# Patient Record
Sex: MALE | Race: BLACK OR AFRICAN AMERICAN | NOT HISPANIC OR LATINO | URBAN - METROPOLITAN AREA
[De-identification: names, ages, dates, MRNs, and addresses within clinical notes are randomized per-mention and may not be internally consistent; named-entity substitution may affect disease eponyms.]

---

## 2017-11-04 ENCOUNTER — EMERGENCY (EMERGENCY)
Facility: HOSPITAL | Age: 27
LOS: 0 days | Discharge: HOME | End: 2017-11-04
Attending: EMERGENCY MEDICINE
Payer: COMMERCIAL

## 2017-11-04 VITALS
DIASTOLIC BLOOD PRESSURE: 67 MMHG | SYSTOLIC BLOOD PRESSURE: 118 MMHG | OXYGEN SATURATION: 100 % | RESPIRATION RATE: 17 BRPM | WEIGHT: 192.02 LBS | HEART RATE: 82 BPM | TEMPERATURE: 98 F | HEIGHT: 69 IN

## 2017-11-04 PROCEDURE — 99283 EMERGENCY DEPT VISIT LOW MDM: CPT

## 2017-11-04 PROCEDURE — 73610 X-RAY EXAM OF ANKLE: CPT | Mod: 26,LT

## 2017-11-04 NOTE — ED ADULT TRIAGE NOTE - CHIEF COMPLAINT QUOTE
" My ankle, I hurt a couple days ago and I woke up this morning, I have sharp pain when I put pressure on it" left ankle

## 2017-11-04 NOTE — ED ADULT NURSE NOTE - CARDIO ASSESSMENT
Date: 10/18/2021    Patient Name: Talita Carter          To Whom it may concern: This letter has been written at the patient's request. The above patient was seen at the [facility] for treatment of a medical condition.     Lisa Collazo may retu WDL

## 2017-11-04 NOTE — ED ADULT NURSE NOTE - OBJECTIVE STATEMENT
pt states he hurt his foot at work a few days ago.  pt stated the door hit the top of his foot and shin area.  no swelling noted at this time.
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
## 2017-11-06 DIAGNOSIS — S93.402A SPRAIN OF UNSPECIFIED LIGAMENT OF LEFT ANKLE, INITIAL ENCOUNTER: ICD-10-CM
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
## 2017-11-06 DIAGNOSIS — M25.572 PAIN IN LEFT ANKLE AND JOINTS OF LEFT FOOT: ICD-10-CM

## 2019-10-25 ENCOUNTER — EMERGENCY (EMERGENCY)
Facility: HOSPITAL | Age: 29
LOS: 1 days | Discharge: HOME | End: 2019-10-25
Attending: EMERGENCY MEDICINE
Payer: COMMERCIAL

## 2019-10-25 VITALS
OXYGEN SATURATION: 95 % | HEIGHT: 67 IN | WEIGHT: 195.11 LBS | SYSTOLIC BLOOD PRESSURE: 124 MMHG | RESPIRATION RATE: 18 BRPM | HEART RATE: 93 BPM | DIASTOLIC BLOOD PRESSURE: 78 MMHG | TEMPERATURE: 98 F

## 2019-10-25 PROCEDURE — 99284 EMERGENCY DEPT VISIT MOD MDM: CPT | Mod: 25

## 2019-10-25 PROCEDURE — 29515 APPLICATION SHORT LEG SPLINT: CPT

## 2019-10-25 PROCEDURE — 73630 X-RAY EXAM OF FOOT: CPT

## 2019-10-25 PROCEDURE — 73610 X-RAY EXAM OF ANKLE: CPT | Mod: 26,RT

## 2019-10-25 PROCEDURE — 29515 APPLICATION SHORT LEG SPLINT: CPT | Mod: RT

## 2019-10-25 PROCEDURE — 73630 X-RAY EXAM OF FOOT: CPT | Mod: 26,RT

## 2019-10-25 PROCEDURE — 73610 X-RAY EXAM OF ANKLE: CPT

## 2019-10-25 RX ORDER — IBUPROFEN 200 MG
600 TABLET ORAL ONCE
Refills: 0 | Status: COMPLETED | OUTPATIENT
Start: 2019-10-25 | End: 2019-10-25

## 2019-10-25 RX ORDER — ACETAMINOPHEN 500 MG
975 TABLET ORAL ONCE
Refills: 0 | Status: COMPLETED | OUTPATIENT
Start: 2019-10-25 | End: 2019-10-25

## 2019-10-25 RX ADMIN — Medication 600 MILLIGRAM(S): at 10:32

## 2019-10-25 RX ADMIN — Medication 975 MILLIGRAM(S): at 10:31

## 2019-10-25 NOTE — ED ADULT NURSE REASSESSMENT NOTE - NS ED NURSE REASSESS COMMENT FT1
pt with ankle splinted pt demonstrated good crutch walking tecnique in er states he has used crutches before pt for discharge with ortho follow up as instructed by md pt discharged from ft by ER resident with no further nursing intervention

## 2019-10-25 NOTE — ED PROVIDER NOTE - NS ED ROS FT
GENERAL: No fever or chills  EYES: no change in vision  HEENT: no trouble swallowing or speaking  CARDIAC: no chest pain or palpiations   PULMONARY: no cough or SOB  GI: no abdominal pain, nausea, vomiting, diarrhea, or constipation   : No changes in urination  SKIN: no rashes  NEURO: no headache, numbness, or weakness.  MSK: right ankle/ foot pain see hpi

## 2019-10-25 NOTE — ED PROVIDER NOTE - PATIENT PORTAL LINK FT
You can access the FollowMyHealth Patient Portal offered by [facility] by registering at the following website: [email or web address]. By joining [employer]’s FollowMyHealth portal, you will also be able to view your health information using other applications (apps) compatible with our system.

## 2019-10-25 NOTE — ED PROCEDURE NOTE - CPROC ED POST PROC CARE GUIDE1
f/u with podiatry/Elevate the injured extremity as instructed./Keep the cast/splint/dressing clean and dry./Verbal/written post procedure instructions were given to patient/caregiver./Instructed patient/caregiver to follow-up with primary care physician.

## 2019-10-25 NOTE — ED ADULT NURSE NOTE - OBJECTIVE STATEMENT
pt states coming down stairs at work missed step injury to right ankle area when weight bearing pt no hx no meds ice lore applied on arrival to fast track area pt denies any hx or meds pt alvin kumar in FT area

## 2019-10-25 NOTE — ED PROVIDER NOTE - CLINICAL SUMMARY MEDICAL DECISION MAKING FREE TEXT BOX
30 yo M with no PMH presents with R ankle and foot pain after "twisting" his ankle 3 hours ago as he was descending stairs.  Vitals WNL. TTP on dorsal aspect of R foot at the level of the 2nd to 4th metatarsal and tarso-metatarsal joints with associated swelling. Also 5th metatarsal point TTP on the lateral aspect. Normal eversion and inversion. Decreased dorsiflexion. Neurovascularly intact. Soft compartments. No concern for compartment syndrome or neurovascular compromise. Will provide analgesia, X-rays of ankle and foot and re-evaluate. 28 yo M with no PMH presents with R ankle and foot pain after "twisting" his ankle 3 hours ago as he was descending stairs.  Vitals WNL. TTP on dorsal aspect of R foot at the level of the 2nd to 4th metatarsal and tarso-metatarsal joints with associated swelling. Also 5th metatarsal point TTP on the lateral aspect. Normal eversion and inversion. Decreased dorsiflexion. Skin abrasion on lateral aspect pf R foot with tetanus UTD. Neurovascularly intact. Soft compartments. No concern for compartment syndrome or neurovascular compromise. Will provide analgesia, X-rays of ankle and foot and re-evaluate. 30 yo M with no PMH presents with R ankle and foot pain after "twisting" his ankle 3 hours ago as he was descending stairs.  Vitals WNL. TTP on dorsal aspect of R foot at the level of the 2nd to 4th metatarsal and tarso-metatarsal joints with associated swelling. Also 5th metatarsal point TTP on the lateral aspect. Normal eversion and inversion. Decreased dorsiflexion. Skin abrasion on lateral aspect pf R foot with tetanus UTD. Neurovascularly intact. Soft compartments. No concern for compartment syndrome or neurovascular compromise. Will provide analgesia, X-rays of ankle and foot and re-evaluate.  --BMM

## 2019-10-25 NOTE — ED PROVIDER NOTE - NSFOLLOWUPINSTRUCTIONS_ED_ALL_ED_FT
You were seen in the ED for ankle . foot pain  The following labs/imaging were obtained: see attached  Take Acetaminophen (Tylenol) (no more than 4,000mg in 24 hrs) AND /OR Ibuprofen (Motrin) with meals (no more than 1,200mg in 24 hrs) for pain.  Return to the ED if you develop worsening foot pain, tingling on your foot / toes, abnormal skin color, or any new concerning symptom.   Follow up with podiatry in 1 week.   Discussed with pt results of work up, strict return precautions, and need for follow up.  Pt expressed understanding and agrees with plan. You were seen in the ED for ankle . foot pain  The following labs/imaging were obtained: see attached  Take Acetaminophen (Tylenol) (no more than 4,000mg in 24 hrs) AND /OR Ibuprofen (Motrin) with meals (no more than 1,200mg in 24 hrs) for pain.  Wear crutches as instructed. Keep splint on until seen by podiatry. Remove it and come back to the ED if you develop worsening pain or any of the symptoms mentioned above.   Return to the ED if you develop worsening foot pain, tingling on your foot / toes, abnormal skin color, or any new concerning symptom.   Follow up with podiatry in 1 week.   Discussed with pt results of work up, strict return precautions, and need for follow up.  Pt expressed understanding and agrees with plan.

## 2019-10-25 NOTE — ED PROVIDER NOTE - CARE PLAN
Principal Discharge DX:	Closed nondisplaced fracture of navicular bone of right foot, initial encounter

## 2019-10-25 NOTE — ED PROVIDER NOTE - OBJECTIVE STATEMENT
28 yo M with no PMH presents with R ankle and foot pain after "twisting" his ankle 3 hours ago. Reports he twisted his R ankle as he was walking down the stairs and missed a step. Denies falling or head trauma. His pain was not initially as severe and was able to ambulate with some discomfort bearing weight. He reports a 7/10 constant pain on the dorsal and lateral aspect of his right foot, worse on ambulation. Denies knee pain. Reports lateral foot skin abrasion with minimal bleeding. Not on blood thinners. Denies paresthesias. 28 yo M with no PMH presents with R ankle and foot pain after "twisting" his ankle 3 hours ago. Reports he twisted his R ankle as he was walking down the stairs and missed a step. Denies falling or head trauma. His pain was not initially as severe and was able to ambulate with some discomfort bearing weight. He reports a 7/10 constant pain on the dorsal and lateral aspect of his right foot, worse on ambulation. Denies knee pain. Reports lateral foot skin abrasion with minimal bleeding. Last tetanus vaccine was 1 year ago. Not on blood thinners. Denies paresthesias.

## 2019-10-25 NOTE — ED PROVIDER NOTE - CARE PROVIDER_API CALL
Sathish Colby (DPM)  Foot Surgery; Recon RearfootAnkle Surgery  [address] [place] [address]  Phone: [phone]  Fax: [phone]  Follow Up Time:

## 2019-10-25 NOTE — ED PROVIDER NOTE - PHYSICAL EXAMINATION
Gen: AAOx3, non-toxic  Head: NCAT  HEENT: EOMI, oral mucosa moist, normal conjunctiva  Lung: CTAB, no respiratory distress, no wheezes/rhonchi/rales B/L, speaking in full sentences  CV: RRR, no murmurs, rubs or gallops  Abd: soft, NTND, no guarding, no CVA tenderness  MSK: TTP on dorsal aspect of R foot at the level of the 2nd to 4th metatarsal and tarso-metatarsal joints with associated swelling. Also 5th metatarsal point TTP on the lateral aspect. Normal eversion and inversion. Decreased dorsiflexion. Neurovascularly intact. Soft compartments.   Neuro: No focal sensory or motor deficits, normal CN exam   Skin: Warm, well perfused, no rash  Psych: normal affect. Gen: AAOx3, non-toxic  Head: NCAT  HEENT: EOMI, oral mucosa moist, normal conjunctiva  Lung: CTAB, no respiratory distress, no wheezes/rhonchi/rales B/L, speaking in full sentences  CV: RRR, no murmurs, rubs or gallops  Abd: soft, NTND, no guarding, no CVA tenderness  MSK: TTP on dorsal aspect of R foot at the level of the 2nd to 4th metatarsal and tarso-metatarsal joints with associated swelling. Also 5th metatarsal point TTP on the lateral aspect. Normal eversion and inversion. Decreased dorsiflexion. Neurovascularly intact. Soft compartments. Has skin abrasion on lateral aspect of R foot.   Neuro: No focal sensory or motor deficits, normal CN exam   Skin: Warm, well perfused, no rash  Psych: normal affect.

## 2019-12-13 ENCOUNTER — EMERGENCY (EMERGENCY)
Facility: HOSPITAL | Age: 29
LOS: 1 days | Discharge: HOME | End: 2019-12-13
Attending: EMERGENCY MEDICINE
Payer: SELF-PAY

## 2019-12-13 VITALS
HEIGHT: 68 IN | OXYGEN SATURATION: 100 % | TEMPERATURE: 98 F | RESPIRATION RATE: 16 BRPM | HEART RATE: 67 BPM | DIASTOLIC BLOOD PRESSURE: 89 MMHG | WEIGHT: 190.04 LBS | SYSTOLIC BLOOD PRESSURE: 152 MMHG

## 2019-12-13 LAB
ALBUMIN SERPL ELPH-MCNC: 4.6 G/DL — SIGNIFICANT CHANGE UP (ref 3.3–5.2)
ALP SERPL-CCNC: 64 U/L — SIGNIFICANT CHANGE UP (ref 40–120)
ALT FLD-CCNC: 19 U/L — SIGNIFICANT CHANGE UP
ANION GAP SERPL CALC-SCNC: 15 MMOL/L — SIGNIFICANT CHANGE UP (ref 5–17)
AST SERPL-CCNC: 28 U/L — SIGNIFICANT CHANGE UP
BILIRUB SERPL-MCNC: 0.4 MG/DL — SIGNIFICANT CHANGE UP (ref 0.4–2)
BUN SERPL-MCNC: 14 MG/DL — SIGNIFICANT CHANGE UP (ref 8–20)
CALCIUM SERPL-MCNC: 9.5 MG/DL — SIGNIFICANT CHANGE UP (ref 8.6–10.2)
CHLORIDE SERPL-SCNC: 101 MMOL/L — SIGNIFICANT CHANGE UP (ref 98–107)
CO2 SERPL-SCNC: 24 MMOL/L — SIGNIFICANT CHANGE UP (ref 22–29)
CREAT SERPL-MCNC: 0.98 MG/DL — SIGNIFICANT CHANGE UP (ref 0.5–1.3)
GLUCOSE SERPL-MCNC: 90 MG/DL — SIGNIFICANT CHANGE UP (ref 70–115)
HCT VFR BLD CALC: 39.9 % — SIGNIFICANT CHANGE UP (ref 39–50)
HGB BLD-MCNC: 13.7 G/DL — SIGNIFICANT CHANGE UP (ref 13–17)
MCHC RBC-ENTMCNC: 30.4 PG — SIGNIFICANT CHANGE UP (ref 27–34)
MCHC RBC-ENTMCNC: 34.3 GM/DL — SIGNIFICANT CHANGE UP (ref 32–36)
MCV RBC AUTO: 88.5 FL — SIGNIFICANT CHANGE UP (ref 80–100)
PLATELET # BLD AUTO: 174 K/UL — SIGNIFICANT CHANGE UP (ref 150–400)
POTASSIUM SERPL-MCNC: 4 MMOL/L — SIGNIFICANT CHANGE UP (ref 3.5–5.3)
POTASSIUM SERPL-SCNC: 4 MMOL/L — SIGNIFICANT CHANGE UP (ref 3.5–5.3)
PROT SERPL-MCNC: 7.5 G/DL — SIGNIFICANT CHANGE UP (ref 6.6–8.7)
RBC # BLD: 4.51 M/UL — SIGNIFICANT CHANGE UP (ref 4.2–5.8)
RBC # FLD: 11.9 % — SIGNIFICANT CHANGE UP (ref 10.3–14.5)
SODIUM SERPL-SCNC: 140 MMOL/L — SIGNIFICANT CHANGE UP (ref 135–145)
TROPONIN T SERPL-MCNC: <0.01 NG/ML — SIGNIFICANT CHANGE UP (ref 0–0.06)
WBC # BLD: 8.67 K/UL — SIGNIFICANT CHANGE UP (ref 3.8–10.5)
WBC # FLD AUTO: 8.67 K/UL — SIGNIFICANT CHANGE UP (ref 3.8–10.5)

## 2019-12-13 PROCEDURE — 99284 EMERGENCY DEPT VISIT MOD MDM: CPT

## 2019-12-13 PROCEDURE — 85027 COMPLETE CBC AUTOMATED: CPT

## 2019-12-13 PROCEDURE — 71046 X-RAY EXAM CHEST 2 VIEWS: CPT | Mod: 26

## 2019-12-13 PROCEDURE — 93010 ELECTROCARDIOGRAM REPORT: CPT

## 2019-12-13 PROCEDURE — 80053 COMPREHEN METABOLIC PANEL: CPT

## 2019-12-13 PROCEDURE — 71046 X-RAY EXAM CHEST 2 VIEWS: CPT

## 2019-12-13 PROCEDURE — 84484 ASSAY OF TROPONIN QUANT: CPT

## 2019-12-13 PROCEDURE — 36415 COLL VENOUS BLD VENIPUNCTURE: CPT

## 2019-12-13 PROCEDURE — 93005 ELECTROCARDIOGRAM TRACING: CPT

## 2019-12-13 PROCEDURE — 99284 EMERGENCY DEPT VISIT MOD MDM: CPT | Mod: 25

## 2019-12-13 PROCEDURE — 99053 MED SERV 10PM-8AM 24 HR FAC: CPT

## 2019-12-13 RX ORDER — IBUPROFEN 200 MG
600 TABLET ORAL ONCE
Refills: 0 | Status: COMPLETED | OUTPATIENT
Start: 2019-12-13 | End: 2019-12-13

## 2019-12-13 RX ADMIN — Medication 600 MILLIGRAM(S): at 03:38

## 2019-12-13 NOTE — ED PROVIDER NOTE - NSFOLLOWUPCLINICS_GEN_ALL_ED_FT
[facility]  Cardiology  [address] [place] [address]  Phone: [phone]  Fax:   Follow Up Time:

## 2019-12-13 NOTE — ED PROVIDER NOTE - PATIENT PORTAL LINK FT
You can access the FollowMyHealth Patient Portal offered by [facility] by registering at the following website: [email or web address]. By joining [employer]’s FollowMyHealth portal, you will also be able to view your health information using other applications (apps) compatible with our system.

## 2019-12-13 NOTE — ED PROVIDER NOTE - OBJECTIVE STATEMENT
28 yo male no significant past medical hx presenting in [employer] custody with center chest pain worse with movements and breathing. states that pain started after lifting this afternoon and again when arguing with partner prior to being arrested. smoker daily. no cardiac hx no family cardiac issues. denies majuana use or illicit drug use. social drinker. denies recent sick contact cold symptoms fever chills nausea vomiting abdominal pain or sob. no medication taken for symptoms.

## 2019-12-13 NOTE — ED PROVIDER NOTE - CARDIAC, MLM
Normal rate, regular rhythm.  Heart sounds S1, S2.  No murmurs, rubs or gallops. sub sternal chest tenderness with palpation
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
## 2019-12-13 NOTE — ED PROVIDER NOTE - ATTENDING CONTRIBUTION TO CARE
29y old with complaints of chets pain, plan to get ekg, chest xray, risk factors, of cardiac causea, plan to educte patiet,  I personally saw the patient with the PA, and completed the key components of the history and physical exam. I then discussed the management plan with the PA.

## 2020-12-03 ENCOUNTER — EMERGENCY (EMERGENCY)
Facility: HOSPITAL | Age: 30
LOS: 1 days | Discharge: HOME | End: 2020-12-03
Attending: EMERGENCY MEDICINE
Payer: COMMERCIAL

## 2020-12-03 VITALS
SYSTOLIC BLOOD PRESSURE: 121 MMHG | RESPIRATION RATE: 20 BRPM | TEMPERATURE: 99 F | WEIGHT: 201.94 LBS | HEART RATE: 92 BPM | DIASTOLIC BLOOD PRESSURE: 79 MMHG | HEIGHT: 67 IN | OXYGEN SATURATION: 97 %

## 2020-12-03 VITALS
DIASTOLIC BLOOD PRESSURE: 78 MMHG | SYSTOLIC BLOOD PRESSURE: 120 MMHG | TEMPERATURE: 98 F | HEART RATE: 85 BPM | OXYGEN SATURATION: 98 % | RESPIRATION RATE: 20 BRPM

## 2020-12-03 LAB
ALBUMIN SERPL ELPH-MCNC: 4.9 G/DL — SIGNIFICANT CHANGE UP (ref 3.3–5)
ALP SERPL-CCNC: 60 U/L — SIGNIFICANT CHANGE UP (ref 40–120)
ALT FLD-CCNC: 19 U/L — SIGNIFICANT CHANGE UP (ref 10–45)
ANION GAP SERPL CALC-SCNC: 11 MMOL/L — SIGNIFICANT CHANGE UP (ref 5–17)
APPEARANCE UR: CLEAR — SIGNIFICANT CHANGE UP
AST SERPL-CCNC: 23 U/L — SIGNIFICANT CHANGE UP (ref 10–40)
BASOPHILS # BLD AUTO: 0 K/UL — SIGNIFICANT CHANGE UP (ref 0–0.2)
BASOPHILS NFR BLD AUTO: 0 % — SIGNIFICANT CHANGE UP (ref 0–2)
BILIRUB SERPL-MCNC: 0.5 MG/DL — SIGNIFICANT CHANGE UP (ref 0.2–1.2)
BILIRUB UR-MCNC: NEGATIVE — SIGNIFICANT CHANGE UP
BUN SERPL-MCNC: 13 MG/DL — SIGNIFICANT CHANGE UP (ref 7–23)
CALCIUM SERPL-MCNC: 9.4 MG/DL — SIGNIFICANT CHANGE UP (ref 8.4–10.5)
CHLORIDE SERPL-SCNC: 101 MMOL/L — SIGNIFICANT CHANGE UP (ref 96–108)
CK SERPL-CCNC: 994 U/L — HIGH (ref 30–200)
CO2 SERPL-SCNC: 27 MMOL/L — SIGNIFICANT CHANGE UP (ref 22–31)
COLOR SPEC: YELLOW — SIGNIFICANT CHANGE UP
CREAT SERPL-MCNC: 1.28 MG/DL — SIGNIFICANT CHANGE UP (ref 0.5–1.3)
DIFF PNL FLD: ABNORMAL
EOSINOPHIL # BLD AUTO: 0 K/UL — SIGNIFICANT CHANGE UP (ref 0–0.5)
EOSINOPHIL NFR BLD AUTO: 0 % — SIGNIFICANT CHANGE UP (ref 0–6)
GLUCOSE SERPL-MCNC: 85 MG/DL — SIGNIFICANT CHANGE UP (ref 70–99)
GLUCOSE UR QL: NEGATIVE — SIGNIFICANT CHANGE UP
HCT VFR BLD CALC: 43.9 % — SIGNIFICANT CHANGE UP (ref 39–50)
HGB BLD-MCNC: 14.8 G/DL — SIGNIFICANT CHANGE UP (ref 13–17)
HIV 1 & 2 AB SERPL IA.RAPID: SIGNIFICANT CHANGE UP
KETONES UR-MCNC: NEGATIVE — SIGNIFICANT CHANGE UP
LEUKOCYTE ESTERASE UR-ACNC: ABNORMAL
LYMPHOCYTES # BLD AUTO: 0.64 K/UL — LOW (ref 1–3.3)
LYMPHOCYTES # BLD AUTO: 10.4 % — LOW (ref 13–44)
MCHC RBC-ENTMCNC: 31.3 PG — SIGNIFICANT CHANGE UP (ref 27–34)
MCHC RBC-ENTMCNC: 33.7 GM/DL — SIGNIFICANT CHANGE UP (ref 32–36)
MCV RBC AUTO: 92.8 FL — SIGNIFICANT CHANGE UP (ref 80–100)
MONOCYTES # BLD AUTO: 1.08 K/UL — HIGH (ref 0–0.9)
MONOCYTES NFR BLD AUTO: 17.4 % — HIGH (ref 2–14)
NEUTROPHILS # BLD AUTO: 4.25 K/UL — SIGNIFICANT CHANGE UP (ref 1.8–7.4)
NEUTROPHILS NFR BLD AUTO: 67.8 % — SIGNIFICANT CHANGE UP (ref 43–77)
NITRITE UR-MCNC: NEGATIVE — SIGNIFICANT CHANGE UP
PH UR: 6 — SIGNIFICANT CHANGE UP (ref 5–8)
PLATELET # BLD AUTO: 159 K/UL — SIGNIFICANT CHANGE UP (ref 150–400)
POTASSIUM SERPL-MCNC: 3.6 MMOL/L — SIGNIFICANT CHANGE UP (ref 3.5–5.3)
POTASSIUM SERPL-SCNC: 3.6 MMOL/L — SIGNIFICANT CHANGE UP (ref 3.5–5.3)
PROT SERPL-MCNC: 7.8 G/DL — SIGNIFICANT CHANGE UP (ref 6–8.3)
PROT UR-MCNC: ABNORMAL
RBC # BLD: 4.73 M/UL — SIGNIFICANT CHANGE UP (ref 4.2–5.8)
RBC # FLD: 12 % — SIGNIFICANT CHANGE UP (ref 10.3–14.5)
SODIUM SERPL-SCNC: 139 MMOL/L — SIGNIFICANT CHANGE UP (ref 135–145)
SP GR SPEC: 1.03 — HIGH (ref 1.01–1.02)
UROBILINOGEN FLD QL: NEGATIVE — SIGNIFICANT CHANGE UP
WBC # BLD: 6.19 K/UL — SIGNIFICANT CHANGE UP (ref 3.8–10.5)
WBC # FLD AUTO: 6.19 K/UL — SIGNIFICANT CHANGE UP (ref 3.8–10.5)

## 2020-12-03 PROCEDURE — 99284 EMERGENCY DEPT VISIT MOD MDM: CPT | Mod: 25

## 2020-12-03 PROCEDURE — 87491 CHLMYD TRACH DNA AMP PROBE: CPT

## 2020-12-03 PROCEDURE — 86703 HIV-1/HIV-2 1 RESULT ANTBDY: CPT

## 2020-12-03 PROCEDURE — 82550 ASSAY OF CK (CPK): CPT

## 2020-12-03 PROCEDURE — 76770 US EXAM ABDO BACK WALL COMP: CPT

## 2020-12-03 PROCEDURE — 76770 US EXAM ABDO BACK WALL COMP: CPT | Mod: 26

## 2020-12-03 PROCEDURE — 85025 COMPLETE CBC W/AUTO DIFF WBC: CPT

## 2020-12-03 PROCEDURE — 81001 URINALYSIS AUTO W/SCOPE: CPT

## 2020-12-03 PROCEDURE — 99285 EMERGENCY DEPT VISIT HI MDM: CPT

## 2020-12-03 PROCEDURE — 87591 N.GONORRHOEAE DNA AMP PROB: CPT

## 2020-12-03 PROCEDURE — 80053 COMPREHEN METABOLIC PANEL: CPT

## 2020-12-03 PROCEDURE — 96372 THER/PROPH/DIAG INJ SC/IM: CPT

## 2020-12-03 PROCEDURE — 87086 URINE CULTURE/COLONY COUNT: CPT

## 2020-12-03 RX ORDER — CEFPODOXIME PROXETIL 100 MG
200 TABLET ORAL EVERY 12 HOURS
Refills: 0 | Status: DISCONTINUED | OUTPATIENT
Start: 2020-12-03 | End: 2020-12-03

## 2020-12-03 RX ORDER — AZITHROMYCIN 500 MG/1
1000 TABLET, FILM COATED ORAL ONCE
Refills: 0 | Status: COMPLETED | OUTPATIENT
Start: 2020-12-03 | End: 2020-12-03

## 2020-12-03 RX ORDER — CEFTRIAXONE 500 MG/1
250 INJECTION, POWDER, FOR SOLUTION INTRAMUSCULAR; INTRAVENOUS ONCE
Refills: 0 | Status: COMPLETED | OUTPATIENT
Start: 2020-12-03 | End: 2020-12-03

## 2020-12-03 RX ORDER — CEFPODOXIME PROXETIL 100 MG
1 TABLET ORAL
Qty: 20 | Refills: 0
Start: 2020-12-03 | End: 2020-12-12

## 2020-12-03 RX ORDER — AZITHROMYCIN 500 MG/1
1000 TABLET, FILM COATED ORAL ONCE
Refills: 0 | Status: DISCONTINUED | OUTPATIENT
Start: 2020-12-03 | End: 2020-12-03

## 2020-12-03 RX ADMIN — AZITHROMYCIN 1000 MILLIGRAM(S): 500 TABLET, FILM COATED ORAL at 15:21

## 2020-12-03 RX ADMIN — CEFTRIAXONE 250 MILLIGRAM(S): 500 INJECTION, POWDER, FOR SOLUTION INTRAMUSCULAR; INTRAVENOUS at 15:22

## 2020-12-03 NOTE — ED PROVIDER NOTE - PROGRESS NOTE DETAILS
Luke Phelan MD:  Discussed D/C instructions, patient stated will followup with urology, plans to rehydrate for mild rhabdo likely in setting of extensive workouts.

## 2020-12-03 NOTE — ED ADULT NURSE REASSESSMENT NOTE - NS ED NURSE REASSESS COMMENT FT1
Pt remains AAOx4, NAD, resp nonlabored, resting comfortably in bed with call bell at bedside. Pt c/o urinary symptoms, denies wanting any medication for pain. Pt denies headache, dizziness, chest pain, palpitations, SOB, abd pain, n/v/d, fevers, chills, weakness at this time. Pt awaiting dispo. Safety maintained.

## 2020-12-03 NOTE — ED PROVIDER NOTE - ATTENDING CONTRIBUTION TO CARE
31 y/o m with pmhx denies, presents for hematuria / and discharge from penis. began a few days ago, also having back pain. no trauma. sexually active with 1 partner (female) and does not always uses barrier contraception. no fever. no weakness or numbness. no surgeries in past   Gen.  well appearing male  HEENT:  perrl eomi  Lungs:  b/l bs  CVS: S1S2   Abd;  soft non tender no distention  Ext: no pitting edema or erythema  Neuro: aaox3   done with PA- circm, no testicular masses or tenderness. no skin changes. serous drainage from penis.   MSK: strength 5/5 b/l upper and lower ext.

## 2020-12-03 NOTE — ED PROVIDER NOTE - NSFOLLOWUPINSTRUCTIONS_ED_ALL_ED_FT
Urinary Tract Infection    A urinary tract infection (UTI) is an infection of any part of the urinary tract, which includes the kidneys, ureters, bladder, and urethra. Risk factors include ignoring your need to urinate, wiping back to front if female, being an uncircumcised male, and having diabetes or a weak immune system. Symptoms include frequent urination, pain or burning with urination, foul smelling urine, cloudy urine, pain in the lower abdomen, blood in the urine, and fever. If you were prescribed an antibiotic medicine, take it as told by your health care provider. Do not stop taking the antibiotic even if you start to feel better.    Take Cefpodoxime 200 mg two times a day for the next 10 days. Please follow up with urology after the next week.    SEEK IMMEDIATE MEDICAL CARE IF YOU HAVE ANY OF THE FOLLOWING SYMPTOMS: severe back or abdominal pain, fever, inability to keep fluids or medicine down, dizziness/lightheadedness, or a change in mental status.

## 2020-12-03 NOTE — ED PROVIDER NOTE - OBJECTIVE STATEMENT
30 Y M No PMH presenting with 3 days of fever, chills, now hematuria, pyuria. Workouts strenuously every day. States worsening dysuria, no history of prior events, no nausea, vomiting, hematemesis, risky sexual behavior, cocaine or other substance use.

## 2020-12-03 NOTE — ED PROVIDER NOTE - PHYSICAL EXAMINATION
Physical Exam:  General: NAD, Conversive  Eyes: EOMI, Conjunctiva and sclera clear  Neck: No JVD  Lungs: Clear to auscultation bilaterally, no wheeze, no rhonchi  Heart: Normal S1, S2, no murmurs  Abdomen: Soft, mild suprapubic tenderness, nondistended, no CVA tenderness  Extremities: 2+ peripheral pulses, no edema,   Gu: pus at urethral meatus, + cremasteric reflexes b/l, no testicular or epidymal tenderness  Psych: AAO X3  Neurologic: Non-focal

## 2020-12-03 NOTE — ED PROVIDER NOTE - PATIENT PORTAL LINK FT
You can access the FollowMyHealth Patient Portal offered by [facility] by registering at the following website: [email or web address]. By joining [employer]’s FollowMyHealth portal, you will also be able to view your health information using other applications (apps) compatible with our system.

## 2020-12-03 NOTE — ED PROVIDER NOTE - NSFOLLOWUPCLINICS_GEN_ALL_ED_FT
[facility] - Urology  Urology  [address], 3rd & 4th [place] [address]  Phone: [phone]  Fax:   Follow Up Time:     [facility]  Urology  [address] [place] [address] - [address]rd [place] [address]  Phone: [phone]  Fax:   Follow Up Time:

## 2020-12-03 NOTE — ED PROCEDURE NOTE - ATTENDING CONTRIBUTION TO CARE
I have participated in and supervised all key portions of the above procedures and agree with the above documentation. UMAIR Joseph MD

## 2020-12-03 NOTE — ED ADULT NURSE NOTE - OBJECTIVE STATEMENT
Pt is a 29 y/o M, no significant PMH, presenting to ED c/o groin pain and chills x 3 days. Pt states he has been having discharge from his penis as well as hematuria. Pt denies any pain to the region. Pt endorses L flank pain, tender to touch and fevers + chills, did not take anything for his fevers. Pt admits to having one sexual partner over the past year, not always using protection. Pt denies headache, dizziness, chest pain, palpitations, cough, SOB, abdominal pain, n/v/d, weakness at this time. Pt is A&Ox4, moves all extremities, changed into hospital gown with call bell at bedside and safety maintained. Pt is a 29 y/o M, no significant PMH, presenting to ED c/o groin pain and chills x 3 days. Pt states he has been having discharge from his penis as well as hematuria. Pt denies any pain to the region. Pt endorses R flank pain, tender to touch and fevers + chills, did not take anything for his fevers. Pt admits to having one sexual partner over the past year, not always using protection. Pt denies headache, dizziness, chest pain, palpitations, cough, SOB, abdominal pain, n/v/d, weakness at this time. Pt is A&Ox4, moves all extremities, changed into hospital gown with call bell at bedside and safety maintained.

## 2020-12-03 NOTE — ED PROCEDURE NOTE - PROCEDURE ADDITIONAL DETAILS
Emergency Department Focused Ultrasound performed at patient's bedside. The study will have a follow up study performed or was performed in the direct supervision of an ultrasound trained attending.

## 2020-12-04 LAB
CULTURE RESULTS: NO GROWTH — SIGNIFICANT CHANGE UP
SPECIMEN SOURCE: SIGNIFICANT CHANGE UP

## 2020-12-05 LAB
C TRACH RRNA SPEC QL NAA+PROBE: SIGNIFICANT CHANGE UP
N GONORRHOEA RRNA SPEC QL NAA+PROBE: DETECTED

## 2020-12-06 NOTE — ED POST DISCHARGE NOTE - DETAILS
lvm to call back admin line, would like to ensure patient aware partners need to be tested, follow up etc. - Cassie Sweeney PA-C 12/7/20 - [facility] for 1522.  Cecil 12/8 - third msg left. If no call back today will send certified letter. - Merlene Abreu PA-C

## 2021-01-27 NOTE — ED ADULT NURSE NOTE - NS ED NURSE LEVEL OF CONSCIOUSNESS ORIENTATION
[facility]   [phone]    [facility] [phone]      Stapled Wound Care      ? No strenuous activity for 48 hours. Resume moderate activity in 48 hours. No heavy exercising until you are seen for follow up in one week.    ? Take Tylenol as needed for discomfort.                                        ? Do not drink alcoholic beverages for 48 hours.    ? Keep the pressure bandage in place for 24 hours. If the bandage becomes blood tinged or loose, reinforce it with gauze and tape.   ? Remove bandage in 24 hours and begin wound care as follows:     1. Rinse the stapled area with tap water. (shower / bathe / shampoo normally)  2. Dry wound with Q tip or gauze pad  3. Apply Polysporin or Bacitracin Ointment to the staples.    Do NOT use Neosporin Ointment *  4. Cover the wound with a bandaid or nonstick gauze pad and paper tape.  5. Repeat wound care once a day until staples are removed.      POSSIBLE COMPLICATIONS    BLEEDIN. Leave the bandage in place.  2. Use tightly rolled up gauze or a cloth to apply direct pressure over the bandage for 20   minutes.  3. Reapply pressure for an additional 20 minutes if necessary  4. Call the office or go to the nearest emergency room if pressure fails to stop the bleeding.  5. Use additional gauze and tape to maintain pressure once the bleeding has stopped.        PAIN:    1. Post operative pain should slowly get better, never worse.  2. A severe increase in pain may indicate a problem. Call the office if this occurs.    In case of emergency phone:Dr Burnett [phone]        
Oriented - self; Oriented - place; Oriented - time

## 2022-05-07 NOTE — ED PROVIDER NOTE - NS ED ROS FT
GENERAL: + fever + chills  EYES: No change in vision  HEENT: No trouble swallowing or speaking  CARDIAC: No chest pain  PULMONARY: No cough or SOB  GI: + abdominal pain, no nausea or no vomiting, no diarrhea or constipation  : + dysuria, + hematuria, + pyuria  SKIN: No rashes  NEURO: No headache, no numbness  MSK: No joint pain  Otherwise as HPI or negative. Received supine in bed with + heplock IV, RA. Patient in no apparent distress.

## 2023-01-11 ENCOUNTER — EMERGENCY (EMERGENCY)
Facility: HOSPITAL | Age: 33
LOS: 1 days | Discharge: HOME | End: 2023-01-11
Attending: EMERGENCY MEDICINE
Payer: SELF-PAY

## 2023-01-11 VITALS
TEMPERATURE: 98 F | HEART RATE: 78 BPM | SYSTOLIC BLOOD PRESSURE: 143 MMHG | DIASTOLIC BLOOD PRESSURE: 86 MMHG | RESPIRATION RATE: 16 BRPM | OXYGEN SATURATION: 98 %

## 2023-01-11 PROCEDURE — 99284 EMERGENCY DEPT VISIT MOD MDM: CPT
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
## 2023-01-12 PROCEDURE — 99283 EMERGENCY DEPT VISIT LOW MDM: CPT

## 2023-01-12 RX ORDER — METHOCARBAMOL 500 MG/1
1 TABLET, FILM COATED ORAL
Qty: 15 | Refills: 0
Start: 2023-01-12 | End: 2023-01-16

## 2023-01-12 RX ORDER — METHOCARBAMOL 500 MG/1
1500 TABLET, FILM COATED ORAL ONCE
Refills: 0 | Status: COMPLETED | OUTPATIENT
Start: 2023-01-12 | End: 2023-01-12

## 2023-01-12 RX ADMIN — METHOCARBAMOL 1500 MILLIGRAM(S): 500 TABLET, FILM COATED ORAL at 00:23

## 2023-01-12 NOTE — ED PROVIDER NOTE - PHYSICAL EXAMINATION
HEENT: atraumatic, no raccoon eyes, no hickey sings, no hemotympanum, PERRL, EOMI, no nystagmus, no dental injuries  Neck: supple, no midline tenderness to palpation, tender to cervical spine paraspinal m,+ FROM, NEXUS negative, no abrasions, no ecchymosis  Chest: non tender, equal expansion bilaterally, no ecchymosis, no abrasions, seatbelt sign negative.  Lungs: CTA, good air entry bilaterally, no wheezing, no rales, no rhonchi  Abdomen: soft, non tender, no guarding, no rebound, no distention, no ecchymosis  Back: no midline tenderness to palpation   Extremities: atraumatic, + FROM, 5/5 strength  Skin: no rash, no lacs, no abrasion  Neuro: A & O x 3, clear speech, steady gait, cerrebellar intact, no focal deficits, CN II-XII intact, neg pronator sign
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
## 2023-01-12 NOTE — ED PROVIDER NOTE - NS ED ATTENDING STATEMENT MOD
This was a shared visit with the PRECIOUS. I reviewed and verified the documentation and independently performed the documented:

## 2023-01-12 NOTE — ED PROVIDER NOTE - PATIENT PORTAL LINK FT
You can access the FollowMyHealth Patient Portal offered by [facility] by registering at the following website: [email or web address]. By joining [employer]’s FollowMyHealth portal, you will also be able to view your health information using other applications (apps) compatible with our system.
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
## 2023-09-05 ENCOUNTER — APPOINTMENT (OUTPATIENT)
Age: 33
End: 2023-09-05
Payer: COMMERCIAL

## 2023-09-05 PROCEDURE — D0140: CPT

## 2023-09-05 PROCEDURE — D0220: CPT

## 2023-09-19 ENCOUNTER — APPOINTMENT (OUTPATIENT)
Age: 33
End: 2023-09-19
Payer: COMMERCIAL

## 2023-09-19 PROCEDURE — 99024 POSTOP FOLLOW-UP VISIT: CPT

## 2023-09-26 ENCOUNTER — APPOINTMENT (OUTPATIENT)
Age: 33
End: 2023-09-26

## 2023-10-11 NOTE — ED ADULT NURSE NOTE - NS ED NOTE ABUSE RESPONSE YN
Arrived to the [address] [place] [address]. [facility]. [id number] Avelumab infusion and 1 L NS bolus completed. Patient tolerated well. Any issues or concerns during appointment: none. Patient aware of next infusion appointment on 10/25/23 (date) at [phone] (time). Patient aware of next lab and [facility] office visit on 10/25/23 (date) at 56 (time). Patient instructed to call provider with temperature of 100.4 or greater or nausea/vomiting/ diarrhea or pain not controlled by medications  Discharged home ambulatory. Yes

## 2023-11-30 ENCOUNTER — EMERGENCY (EMERGENCY)
Facility: HOSPITAL | Age: 33
LOS: 1 days | Discharge: HOME | End: 2023-11-30
Attending: EMERGENCY MEDICINE
Payer: COMMERCIAL

## 2023-11-30 VITALS
HEIGHT: 67 IN | DIASTOLIC BLOOD PRESSURE: 77 MMHG | RESPIRATION RATE: 20 BRPM | SYSTOLIC BLOOD PRESSURE: 122 MMHG | OXYGEN SATURATION: 98 % | WEIGHT: 201.94 LBS | HEART RATE: 69 BPM | TEMPERATURE: 99 F

## 2023-11-30 PROCEDURE — 99284 EMERGENCY DEPT VISIT MOD MDM: CPT

## 2023-11-30 PROCEDURE — 99283 EMERGENCY DEPT VISIT LOW MDM: CPT

## 2023-11-30 RX ORDER — EMTRICITABINE AND TENOFOVIR DISOPROXIL FUMARATE 200; 300 MG/1; MG/1
1 TABLET, FILM COATED ORAL
Qty: 21 | Refills: 0
Start: 2023-11-30 | End: 2023-12-20

## 2023-11-30 RX ORDER — ACETAMINOPHEN 500 MG
975 TABLET ORAL ONCE
Refills: 0 | Status: COMPLETED | OUTPATIENT
Start: 2023-11-30 | End: 2023-11-30

## 2023-11-30 RX ORDER — RALTEGRAVIR 400 MG/1
1 TABLET, FILM COATED ORAL
Qty: 42 | Refills: 0
Start: 2023-11-30 | End: 2023-12-20

## 2023-11-30 RX ORDER — ONDANSETRON 8 MG/1
1 TABLET, FILM COATED ORAL
Qty: 9 | Refills: 0
Start: 2023-11-30 | End: 2023-12-02

## 2023-11-30 RX ADMIN — Medication 1 TABLET(S): at 14:30

## 2023-11-30 RX ADMIN — Medication 975 MILLIGRAM(S): at 14:16

## 2023-11-30 RX ADMIN — Medication 975 MILLIGRAM(S): at 12:55

## 2023-11-30 NOTE — ED PROVIDER NOTE - PATIENT PORTAL LINK FT
You can access the FollowMyHealth Patient Portal offered by [facility] by registering at the following website: [email or web address]. By joining [employer]’s FollowMyHealth portal, you will also be able to view your health information using other applications (apps) compatible with our system. You can access the FollowMyHealth Patient Portal offered by [facility] by registering at the following website: [email or web address]. By joining [employer]’s FollowMyHealth portal, you will also be able to view your health information using other applications (apps) compatible with our system. You can access the FollowMyHealth Patient Portal offered by [facility] by registering at the following website: [email or web address]. By joining [employer]’s FollowMyHealth portal, you will also be able to view your health information using other applications (apps) compatible with our system.

## 2023-11-30 NOTE — ED PROVIDER NOTE - PHYSICAL EXAMINATION
CONSTITUTIONAL: Well appearing and in no apparent distress.  ENT: Airway patent, moist mucous membranes.   EYES: Pupils equal, round and reactive to light. EOMI. Conjunctiva normal appearing.   CARDIAC: Normal rate, regular rhythm.  Heart sounds S1, S2.    RESPIRATORY: Breath sounds clear and equal bilaterally.   GASTROINTESTINAL: Abdomen soft, non-tender, not distended.  MUSCULOSKELETAL: Spine appears normal.  Right 4th tip of finger laceration to lateral aspect of digit approx 0.5cm in length. No active bleeding.   Flushed and irrigated thoroughly with 500cc of NS.   NEUROLOGICAL: Alert and oriented x3, no focal deficits, no motor or sensory deficits. 5/5 muscle strength throughout.  SKIN: Skin normal color, warm, dry and intact.   PSYCHIATRIC: Normal mood and affect.

## 2023-11-30 NOTE — ED PROVIDER NOTE - CLINICAL SUMMARY MEDICAL DECISION MAKING FREE TEXT BOX
Elder Jacobsen MD, FACEP: In this physician's medical judgement based on clinical history and physical exam the patient's signs and symptoms lead to differential diagnoses which includes but is not limited to: fingertip laceration from hospital garbage, suspected needle stick  Patient covered with antibiotics and PEP.  Will follow up with employee health

## 2023-11-30 NOTE — ED ADULT NURSE NOTE - OBJECTIVE STATEMENT
33 yr old male was taking garbage out and was stuck by a needle of unknown source. on assessment a and o x 3 lungs clear bad soft non tender no swelling in extremities no n/v/d no fevers no other complaints.

## 2023-11-30 NOTE — ED PROVIDER NOTE - NSFOLLOWUPINSTRUCTIONS_ED_ALL_ED_FT
Please make sure to follow up with your primary care doctor within 1-2 days and with the INFECTIOUS DISEASE specialist. The information for follow up can be found below. Bring a copy of all of your results with you to your follow up appointments.   Return to the ER as discussed if you develop any new or worsening symptoms.    [facility] Division of Infectious Disease  Address: [address] [place] [address]  [place] [address]  Phone: [phone]    You may take Tylenol 1000mg and Ibuprofen 400mg every 6 hours as needed for pain. Take Ibuprofen with food.  Please take all of the medications as prescribed. Take as directed. Please make sure to follow up with your primary care doctor within 1-2 days and with the INFECTIOUS DISEASE specialist. The information for follow up can be found below. Bring a copy of all of your results with you to your follow up appointments.   Return to the ER as discussed if you develop any new or worsening symptoms.    [facility] Division of Infectious Disease  Address: [address] [place] [address]  [place] [address]  Phone: [phone]    You may take Tylenol 1000mg and Ibuprofen 400mg every 6 hours as needed for pain. Take Ibuprofen with food.  Please take all of the medications as prescribed. Take as directed. Please make sure to follow up with your primary care doctor within 1-2 days and with the INFECTIOUS DISEASE specialist. The information for follow up can be found below. Bring a copy of all of your results with you to your follow up appointments.   Return to the ER as discussed if you develop any new or worsening symptoms.    [facility] Division of Infectious Disease  Address: [address] [place] [address]  [place] [address]  Phone: [phone]    You may take Tylenol 1000mg and Ibuprofen 400mg every 6 hours as needed for pain. Take Ibuprofen with food.  Please take all of the medications as prescribed. Take as directed. Please make sure to follow up with your primary care doctor within 1-2 days and with the INFECTIOUS DISEASE specialist. The information for follow up can be found below. Bring a copy of all of your results with you to your follow up appointments.   Return to the ER as discussed if you develop any new or worsening symptoms.    [facility] Division of Infectious Disease  Address: [address] [place] [address]  [place] [address]  Phone: [phone]    You may take Tylenol 1000mg and Ibuprofen 400mg every 6 hours as needed for pain. Take Ibuprofen with food.  Please take all of the medications as prescribed including the antibiotics and post exposure prophylaxis for HIV. Take as directed. Please make sure to follow up with your primary care doctor within 1-2 days and with the INFECTIOUS DISEASE specialist. The information for follow up can be found below. Bring a copy of all of your results with you to your follow up appointments.   Return to the ER as discussed if you develop any new or worsening symptoms.    [facility] Division of Infectious Disease  Address: [address] [place] [address]  [place] [address]  Phone: [phone]    You may take Tylenol 1000mg and Ibuprofen 400mg every 6 hours as needed for pain. Take Ibuprofen with food.  Please take all of the medications as prescribed including the antibiotics and post exposure prophylaxis for HIV. Take as directed. Please make sure to follow up with your primary care doctor within 1-2 days and with the INFECTIOUS DISEASE specialist. The information for follow up can be found below. Bring a copy of all of your results with you to your follow up appointments.   Return to the ER as discussed if you develop any new or worsening symptoms.    [facility] Division of Infectious Disease  Address: [address] [place] [address]  [place] [address]  Phone: [phone]    You may take Tylenol 1000mg and Ibuprofen 400mg every 6 hours as needed for pain. Take Ibuprofen with food.  Please take all of the medications as prescribed including the antibiotics and post exposure prophylaxis for HIV. Take as directed.

## 2023-11-30 NOTE — ED PROVIDER NOTE - OBJECTIVE STATEMENT
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 Pt reports he was changing a regular garbage bag and was stuck with a needle and cut in his finger. Bleed immediately a moderate amount, washed his hands right away. Unsure of needle gauge. C/o mild finger pain at site of injury. Denies finger numbness/tingling/loss of sensation, decreased ROM. Tetanus UTD. 34 yo M with no reported PMH works as [employer] EV[employer] p/w R 4th finger wound from needlestick approx 30mins PTA. Pt reports he was changing a regular garbage bag and was stuck with a needle and cut in his finger. Bleed immediately a moderate amount, washed his hands right away. Unsure of needle gauge. C/o mild finger pain at site of injury. Denies finger numbness/tingling/loss of sensation, decreased ROM. Tetanus UTD. 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 Pt reports he was changing a regular garbage bag and was stuck with a needle and cut in his finger. Bleed immediately a moderate amount, washed his hands right away. Unsure of needle gauge. C/o mild finger pain at site of injury. Denies finger numbness/tingling/loss of sensation, decreased ROM. Tetanus UTD.

## 2023-11-30 NOTE — ED PROVIDER NOTE - NSPTACCESSSVCSAPPTDETAILS_ED_ALL_ED_FT
needle stick at work, [employer] employee needle stick at work, [employer] employee needle stick at work, [employer] employee

## 2023-12-04 PROBLEM — Z00.00 ENCOUNTER FOR PREVENTIVE HEALTH EXAMINATION: Status: ACTIVE | Noted: 2023-12-04

## 2023-12-05 ENCOUNTER — APPOINTMENT (OUTPATIENT)
Dept: INFECTIOUS DISEASE | Facility: CLINIC | Age: 33
End: 2023-12-05

## 2023-12-11 ENCOUNTER — APPOINTMENT (OUTPATIENT)
Dept: INFECTIOUS DISEASE | Facility: CLINIC | Age: 33
End: 2023-12-11
Payer: COMMERCIAL

## 2023-12-11 ENCOUNTER — NON-APPOINTMENT (OUTPATIENT)
Age: 33
End: 2023-12-11

## 2023-12-11 VITALS
WEIGHT: 213 LBS | TEMPERATURE: 97.9 F | SYSTOLIC BLOOD PRESSURE: 138 MMHG | DIASTOLIC BLOOD PRESSURE: 82 MMHG | HEART RATE: 73 BPM | OXYGEN SATURATION: 97 %

## 2023-12-11 VITALS — HEIGHT: 67 IN | BODY MASS INDEX: 33.36 KG/M2

## 2023-12-11 PROCEDURE — 99214 OFFICE O/P EST MOD 30 MIN: CPT

## 2024-01-16 ENCOUNTER — NON-APPOINTMENT (OUTPATIENT)
Age: 34
End: 2024-01-16

## 2024-01-17 ENCOUNTER — APPOINTMENT (OUTPATIENT)
Dept: INFECTIOUS DISEASE | Facility: CLINIC | Age: 34
End: 2024-01-17

## 2024-07-25 NOTE — ED ADULT TRIAGE NOTE - INTERNATIONAL TRAVEL
Good morning Sonia.  I contacted [facility] and they will have your Lamictal refill ready for you today.  You have multiple refills remaining on this Rx.  If you have any questions or concerns please do not hesitate to reach out to us.       Colleen Holm RN  
No

## 2025-08-12 ENCOUNTER — APPOINTMENT (OUTPATIENT)
Age: 35
End: 2025-08-12
Payer: COMMERCIAL

## 2025-08-12 PROCEDURE — SCREENING: CUSTOM
